# Patient Record
Sex: MALE | Race: BLACK OR AFRICAN AMERICAN | NOT HISPANIC OR LATINO | Employment: UNEMPLOYED | URBAN - METROPOLITAN AREA
[De-identification: names, ages, dates, MRNs, and addresses within clinical notes are randomized per-mention and may not be internally consistent; named-entity substitution may affect disease eponyms.]

---

## 2018-06-02 ENCOUNTER — HOSPITAL ENCOUNTER (EMERGENCY)
Facility: HOSPITAL | Age: 48
Discharge: PSYCHIATRIC OR SUBSTANCE USE FACILITY | End: 2018-06-02
Attending: EMERGENCY MEDICINE
Payer: MEDICAID

## 2018-06-02 VITALS
WEIGHT: 160 LBS | RESPIRATION RATE: 18 BRPM | HEIGHT: 73 IN | SYSTOLIC BLOOD PRESSURE: 130 MMHG | TEMPERATURE: 99 F | OXYGEN SATURATION: 99 % | DIASTOLIC BLOOD PRESSURE: 93 MMHG | HEART RATE: 70 BPM | BODY MASS INDEX: 21.2 KG/M2

## 2018-06-02 DIAGNOSIS — F32.A DEPRESSION, UNSPECIFIED DEPRESSION TYPE: Primary | ICD-10-CM

## 2018-06-02 PROBLEM — R45.851 SUICIDAL IDEATION: Status: ACTIVE | Noted: 2018-06-02

## 2018-06-02 PROBLEM — M54.40 CHRONIC RIGHT-SIDED LOW BACK PAIN WITH SCIATICA: Status: ACTIVE | Noted: 2018-06-02

## 2018-06-02 PROBLEM — F12.10 TETRAHYDROCANNABINOL (THC) USE DISORDER, MILD, ABUSE: Status: ACTIVE | Noted: 2018-06-02

## 2018-06-02 PROBLEM — G89.29 CHRONIC RIGHT-SIDED LOW BACK PAIN WITH SCIATICA: Status: ACTIVE | Noted: 2018-06-02

## 2018-06-02 PROBLEM — F32.9 MAJOR DEPRESSION, SINGLE EPISODE: Status: ACTIVE | Noted: 2018-06-02

## 2018-06-02 PROBLEM — D72.829 LEUKOCYTOSIS: Status: ACTIVE | Noted: 2018-06-02

## 2018-06-02 LAB
ALBUMIN SERPL BCP-MCNC: 3.6 G/DL
ALP SERPL-CCNC: 81 U/L
ALT SERPL W/O P-5'-P-CCNC: 8 U/L
AMPHET+METHAMPHET UR QL: NEGATIVE
ANION GAP SERPL CALC-SCNC: 7 MMOL/L
APAP SERPL-MCNC: <3 UG/ML
AST SERPL-CCNC: 19 U/L
BARBITURATES UR QL SCN>200 NG/ML: NEGATIVE
BASOPHILS # BLD AUTO: 0.07 K/UL
BASOPHILS NFR BLD: 0.5 %
BENZODIAZ UR QL SCN>200 NG/ML: NEGATIVE
BILIRUB SERPL-MCNC: 0.3 MG/DL
BILIRUB UR QL STRIP: NEGATIVE
BUN SERPL-MCNC: 22 MG/DL
BZE UR QL SCN: NORMAL
CALCIUM SERPL-MCNC: 9.2 MG/DL
CANNABINOIDS UR QL SCN: NORMAL
CHLORIDE SERPL-SCNC: 111 MMOL/L
CLARITY UR REFRACT.AUTO: CLEAR
CO2 SERPL-SCNC: 24 MMOL/L
COLOR UR AUTO: YELLOW
CREAT SERPL-MCNC: 1.1 MG/DL
CREAT UR-MCNC: 88 MG/DL
DIFFERENTIAL METHOD: ABNORMAL
EOSINOPHIL # BLD AUTO: 0.2 K/UL
EOSINOPHIL NFR BLD: 1.3 %
ERYTHROCYTE [DISTWIDTH] IN BLOOD BY AUTOMATED COUNT: 13.8 %
EST. GFR  (AFRICAN AMERICAN): >60 ML/MIN/1.73 M^2
EST. GFR  (NON AFRICAN AMERICAN): >60 ML/MIN/1.73 M^2
ETHANOL SERPL-MCNC: <10 MG/DL
GLUCOSE SERPL-MCNC: 104 MG/DL
GLUCOSE UR QL STRIP: NEGATIVE
HCT VFR BLD AUTO: 44.8 %
HGB BLD-MCNC: 14.6 G/DL
HGB UR QL STRIP: NEGATIVE
IMM GRANULOCYTES # BLD AUTO: 0.07 K/UL
IMM GRANULOCYTES NFR BLD AUTO: 0.5 %
KETONES UR QL STRIP: NEGATIVE
LEUKOCYTE ESTERASE UR QL STRIP: NEGATIVE
LYMPHOCYTES # BLD AUTO: 1.9 K/UL
LYMPHOCYTES NFR BLD: 13.6 %
MCH RBC QN AUTO: 29.9 PG
MCHC RBC AUTO-ENTMCNC: 32.6 G/DL
MCV RBC AUTO: 92 FL
METHADONE UR QL SCN>300 NG/ML: NEGATIVE
MONOCYTES # BLD AUTO: 0.8 K/UL
MONOCYTES NFR BLD: 5.7 %
NEUTROPHILS # BLD AUTO: 10.9 K/UL
NEUTROPHILS NFR BLD: 78.4 %
NITRITE UR QL STRIP: NEGATIVE
NRBC BLD-RTO: 0 /100 WBC
OPIATES UR QL SCN: NEGATIVE
PCP UR QL SCN>25 NG/ML: NEGATIVE
PH UR STRIP: 5 [PH] (ref 5–8)
PLATELET # BLD AUTO: 271 K/UL
PMV BLD AUTO: 10.5 FL
POTASSIUM SERPL-SCNC: 4.4 MMOL/L
PROT SERPL-MCNC: 6.7 G/DL
PROT UR QL STRIP: NEGATIVE
RBC # BLD AUTO: 4.89 M/UL
SODIUM SERPL-SCNC: 142 MMOL/L
SP GR UR STRIP: 1.02 (ref 1–1.03)
TOXICOLOGY INFORMATION: NORMAL
TSH SERPL DL<=0.005 MIU/L-ACNC: 0.57 UIU/ML
URN SPEC COLLECT METH UR: NORMAL
UROBILINOGEN UR STRIP-ACNC: NEGATIVE EU/DL
WBC # BLD AUTO: 13.92 K/UL

## 2018-06-02 PROCEDURE — 81003 URINALYSIS AUTO W/O SCOPE: CPT | Mod: 59

## 2018-06-02 PROCEDURE — 80320 DRUG SCREEN QUANTALCOHOLS: CPT

## 2018-06-02 PROCEDURE — 80053 COMPREHEN METABOLIC PANEL: CPT

## 2018-06-02 PROCEDURE — 80329 ANALGESICS NON-OPIOID 1 OR 2: CPT

## 2018-06-02 PROCEDURE — 63600175 PHARM REV CODE 636 W HCPCS: Performed by: PHYSICIAN ASSISTANT

## 2018-06-02 PROCEDURE — 85025 COMPLETE CBC W/AUTO DIFF WBC: CPT

## 2018-06-02 PROCEDURE — 80307 DRUG TEST PRSMV CHEM ANLYZR: CPT

## 2018-06-02 PROCEDURE — 96372 THER/PROPH/DIAG INJ SC/IM: CPT

## 2018-06-02 PROCEDURE — 84443 ASSAY THYROID STIM HORMONE: CPT

## 2018-06-02 PROCEDURE — 25000003 PHARM REV CODE 250: Performed by: PHYSICIAN ASSISTANT

## 2018-06-02 PROCEDURE — 99285 EMERGENCY DEPT VISIT HI MDM: CPT | Mod: 25

## 2018-06-02 PROCEDURE — 99283 EMERGENCY DEPT VISIT LOW MDM: CPT | Mod: ,,, | Performed by: EMERGENCY MEDICINE

## 2018-06-02 RX ORDER — KETOROLAC TROMETHAMINE 30 MG/ML
10 INJECTION, SOLUTION INTRAMUSCULAR; INTRAVENOUS
Status: COMPLETED | OUTPATIENT
Start: 2018-06-02 | End: 2018-06-02

## 2018-06-02 RX ORDER — NAPROXEN 500 MG/1
500 TABLET ORAL 2 TIMES DAILY WITH MEALS
Status: DISCONTINUED | OUTPATIENT
Start: 2018-06-03 | End: 2018-06-02

## 2018-06-02 RX ORDER — DIPHENHYDRAMINE HYDROCHLORIDE 50 MG/ML
50 INJECTION INTRAMUSCULAR; INTRAVENOUS EVERY 4 HOURS PRN
Status: DISCONTINUED | OUTPATIENT
Start: 2018-06-02 | End: 2018-06-02 | Stop reason: HOSPADM

## 2018-06-02 RX ORDER — HALOPERIDOL 5 MG/ML
5 INJECTION INTRAMUSCULAR EVERY 4 HOURS PRN
Status: DISCONTINUED | OUTPATIENT
Start: 2018-06-02 | End: 2018-06-02 | Stop reason: HOSPADM

## 2018-06-02 RX ORDER — ACETAMINOPHEN 325 MG/1
650 TABLET ORAL EVERY 6 HOURS PRN
Status: DISCONTINUED | OUTPATIENT
Start: 2018-06-02 | End: 2018-06-02 | Stop reason: HOSPADM

## 2018-06-02 RX ORDER — GABAPENTIN 100 MG/1
300 CAPSULE ORAL 3 TIMES DAILY
Status: DISCONTINUED | OUTPATIENT
Start: 2018-06-02 | End: 2018-06-02 | Stop reason: HOSPADM

## 2018-06-02 RX ORDER — NAPROXEN 250 MG/1
500 TABLET ORAL
Status: DISCONTINUED | OUTPATIENT
Start: 2018-06-02 | End: 2018-06-02 | Stop reason: HOSPADM

## 2018-06-02 RX ADMIN — GABAPENTIN 300 MG: 300 CAPSULE ORAL at 03:06

## 2018-06-02 RX ADMIN — KETOROLAC TROMETHAMINE 10 MG: 30 INJECTION, SOLUTION INTRAMUSCULAR at 03:06

## 2018-06-02 NOTE — ED NOTES
[facility] [employer]  has arrived. Two security guards and RN are present to escort pt to vehicle.  is called [facility] was given Patient Documentation, PEC, and patient belongings. Patient stated that he does not want to call anyone to let them know about transfer to a different facility. Patient is calm and cooperative.

## 2018-06-02 NOTE — CONSULTS
"[facility]  Psychiatry  Consult Note    Patient Name: Jayde Rao  MRN: [id number]   Code Status: No Order  Admission Date: 6/2/2018  Hospital Length of Stay: 0 days  Attending Physician: Carmela Guajardo MD  Primary Care Provider: Primary Doctor No    Current Legal Status: PEC    Patient information was obtained from patient and ER records.   Inpatient consult to Psychiatry  Consult performed by: JOÃO LAWS  Consult ordered by: LEOLA HUTSON        Subjective:     Principal Problem: Suicdal ideation      HPI:   Patient was seen/evaluated by me. Chart reviewed. The student interviewed the pt first and then I performed an evaluation. Our findings are integrated below. We discussed the client's evaluation and devised an assessment/plan.  The student documented parts of the note with supervision and editing.     Per primary service notes:   Patient is a 48 y.o. male with past psychiatric history of "bipolar disorder," who presented voluntarily, reporting depression, feeling tat he has hit "rock bottom" and asking for admission to inpatient psychiatric care. In the ED, Tox + cocaine, THC. Psychiatry was consulted for "depression."      Per chart review:  No prior formal psychiatric evaluations in chart    On interview, patient lying in bed, sits up to talk with interviewer. States that he has been feeling depressed chronically, but mood had started to improve after his most recent psychiatric hospitalization in [place], which he reports ended two Thursdays prior to presentation, stated he was admitted for depression and stabilized on zoloft and remeron, he believes 50mg daily and 45mg nightly respectively, but reports that he decompensated afterwards and attributes this to not being able to fill his medications, though after discussion states that he has been told in the past that alcohol, cocaine and marijuana use can dysregulate mood as well (reports approximately 6pack/week " "etoh and monthly use of cocaine and marijuana, most recent use 3 days prior to presentation).  He reports that he feels that he has hit "rock bottom" and is feeling "hopeless", reporting severe depressed mood, anhedonia, poor concentration, insomnia reporting no more than a few hours of sleep in the last 4 days with associated fatigue, impaired appetite, anxious mood with intrusive negative thoughts that at times goad him to make poor choices, including intrusive thoughts of suicide, though without current intent or plan.     He reported that this inability to fill his rx's was due to his insurance being lapsed. He states he has now taken care of the insurance, but feels too unstable to go home to restart medications at this time and feels he needs initial inpatient stabilization first. States goals are stabilization, then establishing care (has started the process of getting established at [facility] since last discharge).         Medications:  Home Meds: zoloft unknown dose possibly 50mg nightly, and remeron unknown dose but possibly 45mg nightly.        Allergies:  Review of patient's allergies indicates:  No Known Allergies       Past Medical History:  PCP: none  Yes head trauma years ago, got evaluated and got the all clear  No seizures  Past Medical History:   Diagnosis Date    Bipolar depression     Low back pain       denies past surgeries     Past Psychiatric History:  Previous Medication Trials: yes, but did not fill the zoloft/remeron due to  Medicaid    Previous Psychiatric Hospitalizations: yes, 1 week ago for MDD and "bipolar" (had been using cocaine) and in  for MDD and anxiety   Previous Suicide Attempts: no   History of Violence: unknown  Outpatient Psychiatrist: none, was trying to get established at [facility]     Social History:  Marital Status: single  Children: 2   Employment Status/Info: unemployed   Education: high school diploma/GED  Special Ed: no  Housing Status: lives alone " in house   Hobbies/Leisure time: writing   Access to gun: no     Family Psychiatric History: Brother - schizophrenia      Substance Abuse History:  Recreational Drugs: cocaine and marijuana  Use of Alcohol: remote hx binges but never daily drinking, for the past many months no more than 2 beers every 2-3 days  Rehab History: yes, [facility] when federally mandated for 28 days for marijuana  Tobacco Use:yes, 1 pack a day, declined patch     Legal History:  Past Charges/Incarcerations:yes, 15 years plus additional 8 year, patient stated was due to firearm possession   Pending charges:unknown               Hospital Course: No notes on file         Patient History           Medical as of 6/2/2018     Past Medical History     Diagnosis Date Comments Source    Bipolar depression 2012 -- Provider    Low back pain -- -- Provider                  Surgical as of 6/2/2018     Past Surgical History     Procedure Laterality Date Comments Source    LUMBAR DISC SURGERY -- 2011 -- Provider                  Family as of 6/2/2018    **None**           Tobacco Use as of 6/2/2018     Smoking Status Smoking Start Date Smoking Quit Date Packs/day Years Used    Current Every Day Smoker -- -- 0.50 --    Types Comments Smokeless Tobacco Status Smokeless Tobacco Quit Date Source     Cigarettes -- Unknown -- Provider            Alcohol Use as of 6/2/2018     Alcohol Use Drinks/Week Alcohol/Week Comments Source    Yes -- -- -- Provider            Drug Use as of 6/2/2018     Drug Use Types Frequency Comments Source    Yes  Marijuana -- -- Provider            Sexual Activity as of 6/2/2018     Sexually Active Birth Control Partners Comments Source    Yes -- -- -- Provider            Activities of Daily Living as of 6/2/2018    **None**           Social Documentation as of 6/2/2018    **None**           Occupational as of 6/2/2018    **None**           Socioeconomic as of 6/2/2018     Marital Status Spouse Name Number of Children Years Education  "Preferred Language Ethnicity Race Source     -- -- -- English /Black Black or  --         Pertinent History Q A Comments    as of 6/2/2018 Lives with      Place in Birth Order      Lives in      Number of Siblings      Raised by      Legal Involvement      Childhood Trauma      Criminal History of      Financial Status      Highest Level of Education      Does patient have access to a firearm?       Service      Primary Leisure Activity      Spirituality       Past Medical History:   Diagnosis Date    Bipolar depression 2012    Low back pain      Past Surgical History:   Procedure Laterality Date    LUMBAR DISC SURGERY  2011     Family History     None        Social History Main Topics    Smoking status: Current Every Day Smoker     Packs/day: 0.50     Types: Cigarettes    Smokeless tobacco: Not on file    Alcohol use Yes    Drug use: Yes     Types: Marijuana    Sexual activity: Yes     Review of patient's allergies indicates:  No Known Allergies    No current facility-administered medications on file prior to encounter.      No current outpatient prescriptions on file prior to encounter.     Psychotherapeutics     None        Review of Systems   See HPI section    Objective:     Vital Signs (Most Recent):  Temp: 98.1 °F (36.7 °C) (06/02/18 1028)  Pulse: 84 (06/02/18 1028)  Resp: 16 (06/02/18 1028)  BP: (!) 161/85 (06/02/18 1028)  SpO2: 95 % (06/02/18 1028) Vital Signs (24h Range):  Temp:  [98.1 °F (36.7 °C)] 98.1 °F (36.7 °C)  Pulse:  [84] 84  Resp:  [16] 16  SpO2:  [95 %] 95 %  BP: (161)/(85) 161/85     Height: 6' 1" (185.4 cm)  Weight: 72.6 kg (160 lb)  Body mass index is 21.11 kg/m².    No intake or output data in the 24 hours ending 06/02/18 1407    Physical Exam   Psychiatric:   Mental Status Exam  General appearance and behavior: No acute distress, age appropriate, nontoxic, well cared for, tattoos  Level of Consciousness: awake, alert  Attention: " "Attends to interview without distraction   Orientation: intact to self, ED, [facility], situation as per HPI   Psychomotor Behavior: no retardation or agitation  Speech:  normal rate, tone, and articulation of speech  Language: prosody intact, spontaneous, and appropriate without evidence of neologisms or gross idiosyncrasies   Mood: "depressed"   Affect: Anxious  Thought Process: linear, logical, goal directed  Thought Content: intrusive suicidal ideation that has not escalated to intent or plan, no noted homicidal ideation  Perceptual Disturbances: "voices" are described as coming from within his head, more consistent with intrusive internal thoughts than hallucinations   Memory: intact to recent medical events  Fund of Knowledge: Intact and Vocabulary consistent with education  Insight:  Pt has awareness of illness  Judgment: Behavior adequate for circumstances, came to ED to seek help              Significant Labs:   Last 24 Hours:   Recent Lab Results       06/02/18  1057      Benzodiazepines Negative     Methadone metabolites Negative     Phencyclidine Negative     Immature Granulocytes 0.5     Immature Grans (Abs) 0.07  Comment:  Mild elevation in immature granulocytes is non specific and   can be seen in a variety of conditions including stress response,   acute inflammation, trauma and pregnancy. Correlation with other   laboratory and clinical findings is essential.  (H)     Acetaminophen (Tylenol), Serum <3.0  Comment:  Toxic Levels:  Adults (4 hr post-ingestion).........>150 ug/mL  Adults (12 hr post-ingestion)........>40 ug/mL  Peds (2 hr post-ingestion, liquid)...>225 ug/mL  (L)     Albumin 3.6     Alcohol, Medical, Serum <10     Alkaline Phosphatase 81     ALT 8(L)     Amphetamine Screen, Ur Negative     Anion Gap 7(L)     Appearance, UA Clear     AST 19     Barbiturate Screen, Ur Negative     Baso # 0.07     Basophil% 0.5     Bilirubin (UA) Negative     Total Bilirubin 0.3  Comment:  For infants and " newborns, interpretation of results should be based  on gestational age, weight and in agreement with clinical  observations.  Premature Infant recommended reference ranges:  Up to 24 hours.............<8.0 mg/dL  Up to 48 hours............<12.0 mg/dL  3-5 days..................<15.0 mg/dL  6-29 days.................<15.0 mg/dL       BUN, Bld 22(H)     Calcium 9.2     Chloride 111(H)     CO2 24     Cocaine (Metab.) Presumptive Positive     Color, UA Yellow     Creatinine 1.1     Creatinine, Random Ur 88.0  Comment:  The random urine reference ranges provided were established   for 24 hour urine collections.  No reference ranges exist for  random urine specimens.  Correlate clinically.       Differential Method Automated     eGFR if African American >60.0     eGFR if non  >60.0  Comment:  Calculation used to obtain the estimated glomerular filtration  rate (eGFR) is the CKD-EPI equation.        Eos # 0.2     Eosinophil% 1.3     Glucose 104     Glucose, UA Negative     Gran # (ANC) 10.9(H)     Gran% 78.4(H)     Hematocrit 44.8     Hemoglobin 14.6     Ketones, UA Negative     Leukocytes, UA Negative     Lymph # 1.9     Lymph% 13.6(L)     MCH 29.9     MCHC 32.6     MCV 92     Mono # 0.8     Mono% 5.7     MPV 10.5     Nitrite, UA Negative     nRBC 0     Occult Blood UA Negative     Opiate Scrn, Ur Negative     pH, UA 5.0     Platelets 271     Potassium 4.4     Total Protein 6.7     Protein, UA Negative  Comment:  Recommend a 24 hour urine protein or a urine   protein/creatinine ratio if globulin induced proteinuria is  clinically suspected.       RBC 4.89     RDW 13.8     Sodium 142     Specific Gravity, UA 1.020     Specimen UA Urine, Clean Catch     Marijuana (THC) Metabolite Presumptive Positive     Toxicology Information SEE COMMENT  Comment:  This screen includes the following classes of drugs at the   listed cut-off:  Benzodiazepines                  200 ng/ml  Methadone                        300  ng/ml  Cocaine metabolite               300 ng/ml  Opiates                          300 ng/ml  Barbiturates                     200 ng/ml  Amphetamines                    1000 ng/ml  Marijuana metabs (THC)            50 ng/ml  Phencyclidine (PCP)               25 ng/ml  High concentrations of Diphenhydramine may cross-react with  Phencyclidine PCP screening immunoassay giving a false   positive result.  High concentrations of Methylenedioxymethamphetamine (MDMA aka  Ectasy) and other structurally similar compounds may cross-   react with the Amphetamine/Methamphetamine screening   immunoassay giving a false positive result.  A metabolite of the anti-HIV drug Sustiva () may cause  false positive results in the Marijuana metabolite (THC)   screening assay.  Note: This exception list includes only more common   interferants in toxicology screen testing.  Because of many   cross-reactantspositive results on toxicology drug screens   should be confirmed whenever results do not correlate with   clinical presentation.  This report is intended for use in clinical monitoring and  management of patients. It is not intended for use in   employment related drug testing.  Because of any cross-reactants, positive results on toxicology  drug screens should be confirmed whenever results do not  correlate with clinical presentation.  Presumptive positive results are unconfirmed and may be used   only for medical purposes.       TSH 0.570     Urobilinogen, UA Negative     WBC 13.92(H)           Significant Imaging: I have reviewed all pertinent imaging results/findings within the past 24 hours.    Assessment/Plan:     Suicidal ideation    PT presents reporting SI and not feeling safe to go home in the context of low mood, poor sleep, unable to fill psychiatric medications since last discharge from inpatient psychiatric hospitalization, recent cocaine and marijuana use. DDX includes MDD, substance induced mood disorder, bipolar  "current episode depressed (though unclear that he has had sx c/w rafal or hypomania in the absence of substance use). Reports last discharge medications were zoloft and remeron. ("voices" reported less consistent with auditory hallucinations and more consistent with intrusive thoughts)       1. Dispo/Legal Status: Cont PEC at this time as the pt is currently dangerous to self. Seek inpt bed for pt safety and stabilization when/if medically cleared by the ER MD. Continue to observe pt's behavior while in the ER and will reassess the pt daily until placement is found.  2. Scheduled Medications: remeron 30mg nightly. Defer any non-psych meds to the ER MD.  3. PRN Medications: zyprexa 10mg q8h prn non-redirectable agitation associated with breakthrough psychosis or rafal if needed to help the pt more effectively interact with his environment.   4. Precautions/Nursing: suicide  5. Case Discussed with: Dr. Messer, ED staff                 Total Time:  60 minutes      Mariama Hill MD   Psychiatry  [facility]  "
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
## 2018-06-02 NOTE — ED NOTES
Pt has been accepted to [facility] via [facility]. Accepting MD is Dr. Cifuentes. Call report to [phone].      Address:   [address] [place] [address] Ernestina Bush

## 2018-06-02 NOTE — ED TRIAGE NOTES
Pt arrives  - states not had his pysch meds in a week and is afraid of what he will do. Denies SI/HI. AAOx4, skin w/d, respirations even and unlabored Cooperative. Xavier Fraser at bedside. Al personal belongings bagged and labeled and placed in secured pt locker on unit. Xavier has no personal belongings  In room

## 2018-06-02 NOTE — SUBJECTIVE & OBJECTIVE
Patient History           Medical as of 6/2/2018     Past Medical History     Diagnosis Date Comments Source    Bipolar depression 2012 -- Provider    Low back pain -- -- Provider                  Surgical as of 6/2/2018     Past Surgical History     Procedure Laterality Date Comments Source    LUMBAR DISC SURGERY -- 2011 -- Provider                  Family as of 6/2/2018    **None**           Tobacco Use as of 6/2/2018     Smoking Status Smoking Start Date Smoking Quit Date Packs/day Years Used    Current Every Day Smoker -- -- 0.50 --    Types Comments Smokeless Tobacco Status Smokeless Tobacco Quit Date Source     Cigarettes -- Unknown -- Provider            Alcohol Use as of 6/2/2018     Alcohol Use Drinks/Week Alcohol/Week Comments Source    Yes -- -- -- Provider            Drug Use as of 6/2/2018     Drug Use Types Frequency Comments Source    Yes  Marijuana -- -- Provider            Sexual Activity as of 6/2/2018     Sexually Active Birth Control Partners Comments Source    Yes -- -- -- Provider            Activities of Daily Living as of 6/2/2018    **None**           Social Documentation as of 6/2/2018    **None**           Occupational as of 6/2/2018    **None**           Socioeconomic as of 6/2/2018     Marital Status Spouse Name Number of Children Years Education Preferred Language Ethnicity Race Source     -- -- -- English /Black Black or  --         Pertinent History Q A Comments    as of 6/2/2018 Lives with      Place in Birth Order      Lives in      Number of Siblings      Raised by      Legal Involvement      Childhood Trauma      Criminal History of      Financial Status      Highest Level of Education      Does patient have access to a firearm?       Service      Primary Leisure Activity      Spirituality       Past Medical History:   Diagnosis Date    Bipolar depression 2012    Low back pain      Past Surgical History:   Procedure Laterality  "Date    LUMBAR DISC SURGERY  2011     Family History     None        Social History Main Topics    Smoking status: Current Every Day Smoker     Packs/day: 0.50     Types: Cigarettes    Smokeless tobacco: Not on file    Alcohol use Yes    Drug use: Yes     Types: Marijuana    Sexual activity: Yes     Review of patient's allergies indicates:  No Known Allergies    No current facility-administered medications on file prior to encounter.      No current outpatient prescriptions on file prior to encounter.     Psychotherapeutics     None        Review of Systems   See HPI section    Objective:     Vital Signs (Most Recent):  Temp: 98.1 °F (36.7 °C) (06/02/18 1028)  Pulse: 84 (06/02/18 1028)  Resp: 16 (06/02/18 1028)  BP: (!) 161/85 (06/02/18 1028)  SpO2: 95 % (06/02/18 1028) Vital Signs (24h Range):  Temp:  [98.1 °F (36.7 °C)] 98.1 °F (36.7 °C)  Pulse:  [84] 84  Resp:  [16] 16  SpO2:  [95 %] 95 %  BP: (161)/(85) 161/85     Height: 6' 1" (185.4 cm)  Weight: 72.6 kg (160 lb)  Body mass index is 21.11 kg/m².    No intake or output data in the 24 hours ending 06/02/18 1407    Physical Exam   Psychiatric:   Mental Status Exam  General appearance and behavior: No acute distress, age appropriate, nontoxic, well cared for, tattoos  Level of Consciousness: awake, alert  Attention: Attends to interview without distraction   Orientation: intact to self, ED, [facility], situation as per HPI   Psychomotor Behavior: no retardation or agitation  Speech:  normal rate, tone, and articulation of speech  Language: prosody intact, spontaneous, and appropriate without evidence of neologisms or gross idiosyncrasies   Mood: "depressed"   Affect: Anxious  Thought Process: linear, logical, goal directed  Thought Content: intrusive suicidal ideation that has not escalated to intent or plan, no noted homicidal ideation  Perceptual Disturbances: "voices" are described as coming from within his head, more consistent with intrusive internal " thoughts than hallucinations   Memory: intact to recent medical events  Fund of Knowledge: Intact and Vocabulary consistent with education  Insight:  Pt has awareness of illness  Judgment: Behavior adequate for circumstances, came to ED to seek help              Significant Labs:   Last 24 Hours:   Recent Lab Results       06/02/18  1057      Benzodiazepines Negative     Methadone metabolites Negative     Phencyclidine Negative     Immature Granulocytes 0.5     Immature Grans (Abs) 0.07  Comment:  Mild elevation in immature granulocytes is non specific and   can be seen in a variety of conditions including stress response,   acute inflammation, trauma and pregnancy. Correlation with other   laboratory and clinical findings is essential.  (H)     Acetaminophen (Tylenol), Serum <3.0  Comment:  Toxic Levels:  Adults (4 hr post-ingestion).........>150 ug/mL  Adults (12 hr post-ingestion)........>40 ug/mL  Peds (2 hr post-ingestion, liquid)...>225 ug/mL  (L)     Albumin 3.6     Alcohol, Medical, Serum <10     Alkaline Phosphatase 81     ALT 8(L)     Amphetamine Screen, Ur Negative     Anion Gap 7(L)     Appearance, UA Clear     AST 19     Barbiturate Screen, Ur Negative     Baso # 0.07     Basophil% 0.5     Bilirubin (UA) Negative     Total Bilirubin 0.3  Comment:  For infants and newborns, interpretation of results should be based  on gestational age, weight and in agreement with clinical  observations.  Premature Infant recommended reference ranges:  Up to 24 hours.............<8.0 mg/dL  Up to 48 hours............<12.0 mg/dL  3-5 days..................<15.0 mg/dL  6-29 days.................<15.0 mg/dL       BUN, Bld 22(H)     Calcium 9.2     Chloride 111(H)     CO2 24     Cocaine (Metab.) Presumptive Positive     Color, UA Yellow     Creatinine 1.1     Creatinine, Random Ur 88.0  Comment:  The random urine reference ranges provided were established   for 24 hour urine collections.  No reference ranges exist  for  random urine specimens.  Correlate clinically.       Differential Method Automated     eGFR if African American >60.0     eGFR if non  >60.0  Comment:  Calculation used to obtain the estimated glomerular filtration  rate (eGFR) is the CKD-EPI equation.        Eos # 0.2     Eosinophil% 1.3     Glucose 104     Glucose, UA Negative     Gran # (ANC) 10.9(H)     Gran% 78.4(H)     Hematocrit 44.8     Hemoglobin 14.6     Ketones, UA Negative     Leukocytes, UA Negative     Lymph # 1.9     Lymph% 13.6(L)     MCH 29.9     MCHC 32.6     MCV 92     Mono # 0.8     Mono% 5.7     MPV 10.5     Nitrite, UA Negative     nRBC 0     Occult Blood UA Negative     Opiate Scrn, Ur Negative     pH, UA 5.0     Platelets 271     Potassium 4.4     Total Protein 6.7     Protein, UA Negative  Comment:  Recommend a 24 hour urine protein or a urine   protein/creatinine ratio if globulin induced proteinuria is  clinically suspected.       RBC 4.89     RDW 13.8     Sodium 142     Specific Gravity, UA 1.020     Specimen UA Urine, Clean Catch     Marijuana (THC) Metabolite Presumptive Positive     Toxicology Information SEE COMMENT  Comment:  This screen includes the following classes of drugs at the   listed cut-off:  Benzodiazepines                  200 ng/ml  Methadone                        300 ng/ml  Cocaine metabolite               300 ng/ml  Opiates                          300 ng/ml  Barbiturates                     200 ng/ml  Amphetamines                    1000 ng/ml  Marijuana metabs (THC)            50 ng/ml  Phencyclidine (PCP)               25 ng/ml  High concentrations of Diphenhydramine may cross-react with  Phencyclidine PCP screening immunoassay giving a false   positive result.  High concentrations of Methylenedioxymethamphetamine (MDMA aka  Ectasy) and other structurally similar compounds may cross-   react with the Amphetamine/Methamphetamine screening   immunoassay giving a false positive result.  A  metabolite of the anti-HIV drug Sustiva () may cause  false positive results in the Marijuana metabolite (THC)   screening assay.  Note: This exception list includes only more common   interferants in toxicology screen testing.  Because of many   cross-reactantspositive results on toxicology drug screens   should be confirmed whenever results do not correlate with   clinical presentation.  This report is intended for use in clinical monitoring and  management of patients. It is not intended for use in   employment related drug testing.  Because of any cross-reactants, positive results on toxicology  drug screens should be confirmed whenever results do not  correlate with clinical presentation.  Presumptive positive results are unconfirmed and may be used   only for medical purposes.       TSH 0.570     Urobilinogen, UA Negative     WBC 13.92(H)           Significant Imaging: I have reviewed all pertinent imaging results/findings within the past 24 hours.

## 2018-06-02 NOTE — HPI
"Patient was seen/evaluated by me. Chart reviewed. The student interviewed the pt first and then I performed an evaluation. Our findings are integrated below. We discussed the client's evaluation and devised an assessment/plan.  The student documented parts of the note with supervision and editing.     Per primary service notes:   Patient is a 48 y.o. male with past psychiatric history of "bipolar disorder," who presented voluntarily, reporting depression, feeling tat he has hit "rock bottom" and asking for admission to inpatient psychiatric care. In the ED, Tox + cocaine, THC. Psychiatry was consulted for "depression."      Per chart review:  No prior formal psychiatric evaluations in chart    On interview, patient lying in bed, sits up to talk with interviewer. States that he has been feeling depressed chronically, but mood had started to improve after his most recent psychiatric hospitalization in [place], which he reports ended two Thursdays prior to presentation, stated he was admitted for depression and stabilized on zoloft and remeron, he believes 50mg daily and 45mg nightly respectively, but reports that he decompensated afterwards and attributes this to not being able to fill his medications, though after discussion states that he has been told in the past that alcohol, cocaine and marijuana use can dysregulate mood as well (reports approximately 6pack/week etoh and monthly use of cocaine and marijuana, most recent use 3 days prior to presentation).  He reports that he feels that he has hit "rock bottom" and is feeling "hopeless", reporting severe depressed mood, anhedonia, poor concentration, insomnia reporting no more than a few hours of sleep in the last 4 days with associated fatigue, impaired appetite, anxious mood with intrusive negative thoughts that at times goad him to make poor choices, including intrusive thoughts of suicide, though without current intent or plan.     He reported that this " "inability to fill his rx's was due to his insurance being lapsed. He states he has now taken care of the insurance, but feels too unstable to go home to restart medications at this time and feels he needs initial inpatient stabilization first. States goals are stabilization, then establishing care (has started the process of getting established at [facility] since last discharge).         Medications:  Home Meds: zoloft unknown dose possibly 50mg nightly, and remeron unknown dose but possibly 45mg nightly.        Allergies:  Review of patient's allergies indicates:  No Known Allergies       Past Medical History:  PCP: none  Yes head trauma years ago, got evaluated and got the all clear  No seizures  Past Medical History:   Diagnosis Date    Bipolar depression     Low back pain       denies past surgeries     Past Psychiatric History:  Previous Medication Trials: yes, but did not fill the zoloft/remeron due to  Medicaid    Previous Psychiatric Hospitalizations: yes, 1 week ago for MDD and "bipolar" (had been using cocaine) and in  for MDD and anxiety   Previous Suicide Attempts: no   History of Violence: unknown  Outpatient Psychiatrist: none, was trying to get established at [facility]     Social History:  Marital Status: single  Children: 2   Employment Status/Info: unemployed   Education: high school diploma/GED  Special Ed: no  Housing Status: lives alone in house   Hobbies/Leisure time: writing   Access to gun: no     Family Psychiatric History: Brother - schizophrenia      Substance Abuse History:  Recreational Drugs: cocaine and marijuana  Use of Alcohol: remote hx binges but never daily drinking, for the past many months no more than 2 beers every 2-3 days  Rehab History: yes, [facility] when federally mandated for 28 days for marijuana  Tobacco Use:yes, 1 pack a day, declined patch     Legal History:  Past Charges/Incarcerations:yes, 15 years plus additional 8 year, patient stated was due to firearm " possession   Pending charges:unknown

## 2018-06-02 NOTE — ASSESSMENT & PLAN NOTE
"PT presents reporting SI and not feeling safe to go home in the context of low mood, poor sleep, unable to fill psychiatric medications since last discharge from inpatient psychiatric hospitalization, recent cocaine and marijuana use. DDX includes MDD, substance induced mood disorder, bipolar current episode depressed (though unclear that he has had sx c/w rafal or hypomania in the absence of substance use). Reports last discharge medications were zoloft and remeron. ("voices" reported less consistent with auditory hallucinations and more consistent with intrusive thoughts)       1. Dispo/Legal Status: Cont PEC at this time as the pt is currently dangerous to self. Seek inpt bed for pt safety and stabilization when/if medically cleared by the ER MD. Continue to observe pt's behavior while in the ER and will reassess the pt daily until placement is found.  2. Scheduled Medications: remeron 30mg nightly. Defer any non-psych meds to the ER MD.  3. PRN Medications: zyprexa 10mg q8h prn non-redirectable agitation associated with breakthrough psychosis or rafal if needed to help the pt more effectively interact with his environment.   4. Precautions/Nursing: suicide  5. Case Discussed with: Dr. Messer, ED staff      "

## 2018-06-02 NOTE — ED PROVIDER NOTES
"Encounter Date: 6/2/2018    SCRIBE #1 NOTE: I, Trisha Verdugo, am scribing for, and in the presence of,  Dr. Manjarrez. I have scribed the entire note. the APC attestation.       History     Chief Complaint   Patient presents with    Psychiatric Evaluation     Pt states that he hasnt been on his meds and is not sure of how he is feeling.  Pt states "I dont wanna kill nobody but I dont trust myself"     48-year-old male with past medical history of bipolar presents to the ED for psychiatric evaluation.  He was discharged about a week ago from an inpatient psychiatric facility in [place].  States that while he was there he was diagnosed with major depressive disorder and started on Zoloft and Remeron.  He was discharged with prescriptions for the same but has been unable to afford prescription and has not been on any medication for the past week.  He reports some auditory hallucinations.  Denies any suicidal or homicidal ideation but just states that he is "very depressed and has had rock bottom".  He denies fever, chills, chest pain, shortness breath, abdominal pain, nausea, headache.      The history is provided by the patient.     Review of patient's allergies indicates:  No Known Allergies  Past Medical History:   Diagnosis Date    Bipolar depression 2012    Low back pain      Past Surgical History:   Procedure Laterality Date    LUMBAR DISC SURGERY  2011     History reviewed. No pertinent family history.  Social History   Substance Use Topics    Smoking status: Current Every Day Smoker     Packs/day: 0.50     Types: Cigarettes    Smokeless tobacco: Not on file    Alcohol use Yes     Review of Systems   Constitutional: Negative for chills and fever.   HENT: Negative for congestion, rhinorrhea and sore throat.    Eyes: Negative for photophobia and visual disturbance.   Respiratory: Negative for shortness of breath.    Cardiovascular: Negative for chest pain.   Gastrointestinal: Negative for abdominal pain, " constipation, diarrhea, nausea and vomiting.   Genitourinary: Negative for dysuria and hematuria.   Musculoskeletal: Negative for neck pain and neck stiffness.   Neurological: Negative for dizziness, weakness, light-headedness, numbness and headaches.   Psychiatric/Behavioral: Positive for hallucinations. Negative for confusion, sleep disturbance and suicidal ideas.        +depressed       Physical Exam     Initial Vitals [06/02/18 1028]   BP Pulse Resp Temp SpO2   (!) 161/85 84 16 98.1 °F (36.7 °C) 95 %      MAP       110.33         Physical Exam    Constitutional: He appears well-developed and well-nourished. He is not diaphoretic. No distress.   HENT:   Head: Normocephalic and atraumatic.   Neck: Normal range of motion. Neck supple.   Musculoskeletal: Normal range of motion.   Neurological: He is alert and oriented to person, place, and time.   Skin: Skin is warm and dry.   Psychiatric: His speech is normal and behavior is normal. Judgment normal. His mood appears not anxious. His affect is not angry and not inappropriate. He exhibits a depressed mood. He expresses no homicidal and no suicidal ideation. He expresses no suicidal plans and no homicidal plans. He is attentive.         ED Course   Procedures  Labs Reviewed   CBC W/ AUTO DIFFERENTIAL - Abnormal; Notable for the following:        Result Value    WBC 13.92 (*)     Gran # (ANC) 10.9 (*)     Immature Grans (Abs) 0.07 (*)     Gran% 78.4 (*)     Lymph% 13.6 (*)     All other components within normal limits   COMPREHENSIVE METABOLIC PANEL - Abnormal; Notable for the following:     Chloride 111 (*)     BUN, Bld 22 (*)     ALT 8 (*)     Anion Gap 7 (*)     All other components within normal limits   ACETAMINOPHEN LEVEL - Abnormal; Notable for the following:     Acetaminophen (Tylenol), Serum <3.0 (*)     All other components within normal limits   TSH   DRUG SCREEN PANEL, URINE EMERGENCY    Narrative:     Preferred Collection Type->Urine, Clean Catch  cup    ALCOHOL,MEDICAL (ETHANOL)   URINALYSIS, REFLEX TO URINE CULTURE    Narrative:     Preferred Collection Type->Urine, Clean Catch  cup             Medical Decision Making:   History:   Old Medical Records: I decided to obtain old medical records.  Clinical Tests:   Lab Tests: Ordered and Reviewed  Other:   I have discussed this case with another health care provider.       <> Summary of the Discussion: Psychiatry        APC / Resident Notes:   48-year-old male with past medical history of bipolar presents to the ED for psychiatric evaluation.  Vital signs stable. The patient reports depression and that he feels that he has hit rock bottom.  Denies any suicidal or homicidal ideation.  No plan for suicide.  Patient lives alone.  Will obtain psych labs and discuss with Psychiatry.    Patient is medically cleared.    Psychiatry evaluated in the ED. Recommend PEC for SI and depression.    PEC signed by Dr Manjarrez. Transferred to psych facility.    He was given IM toradol and gabapentin for chronic back pain.       Scribe Attestation:   Scribe #1: I performed the above scribed service and the documentation accurately describes the services I performed. I attest to the accuracy of the note.    Attending Attestation:     Physician Attestation Statement for NP/PA:   I have conducted a face to face encounter with this patient in addition to the NP/PA, due to Medical Complexity    Other NP/PA Attestation Additions:    History of Present Illness: 48 y.o. male with history of depression recently admitted for the same. He states that since discharge he has not been taking his medications and has been very depressed. He is concerned he may need to be back in the hospital.    Physical Exam: Normal affect. Good eye contact. Depressed mood.    Medical Decision Making: Pt was evaluated by psychiatry who feels that he should be PEC'd given the severity of his depression and that he is high risk. He was therefore PEC'd and will be  admitted to a psychiatric facility. He is medically cleared for placement.                     Clinical Impression:   The encounter diagnosis was Depression, unspecified depression type.    No orders to display       Disposition:   Disposition: Transferred  Psych                        Suzanne Junior PA-C  06/02/18 9545

## 2018-06-02 NOTE — ED NOTES
Patient on stretcher, Bed placed in low/locked position, side rails up x 2. Patient placed into position of comfort. Patient in NAD and offers no complaints at this time. Sitter at bedside. Will continue to monitor.

## 2020-06-13 ENCOUNTER — HOSPITAL ENCOUNTER (EMERGENCY)
Facility: HOSPITAL | Age: 50
Discharge: PSYCHIATRIC OR SUBSTANCE USE FACILITY | End: 2020-06-15
Attending: EMERGENCY MEDICINE
Payer: MEDICAID

## 2020-06-13 DIAGNOSIS — R45.1 AGITATION: ICD-10-CM

## 2020-06-13 DIAGNOSIS — F23 ACUTE PSYCHOSIS: Primary | ICD-10-CM

## 2020-06-13 LAB
ALBUMIN SERPL BCP-MCNC: 3.7 G/DL (ref 3.5–5.2)
ALP SERPL-CCNC: 103 U/L (ref 55–135)
ALT SERPL W/O P-5'-P-CCNC: 6 U/L (ref 10–44)
ANION GAP SERPL CALC-SCNC: 10 MMOL/L (ref 8–16)
APAP SERPL-MCNC: <3 UG/ML (ref 10–20)
AST SERPL-CCNC: 16 U/L (ref 10–40)
BASOPHILS # BLD AUTO: 0.09 K/UL (ref 0–0.2)
BASOPHILS NFR BLD: 1.3 % (ref 0–1.9)
BILIRUB SERPL-MCNC: 0.2 MG/DL (ref 0.1–1)
BUN SERPL-MCNC: 11 MG/DL (ref 6–20)
CALCIUM SERPL-MCNC: 9.1 MG/DL (ref 8.7–10.5)
CHLORIDE SERPL-SCNC: 113 MMOL/L (ref 95–110)
CO2 SERPL-SCNC: 19 MMOL/L (ref 23–29)
CREAT SERPL-MCNC: 1.1 MG/DL (ref 0.5–1.4)
DIFFERENTIAL METHOD: ABNORMAL
EOSINOPHIL # BLD AUTO: 0.3 K/UL (ref 0–0.5)
EOSINOPHIL NFR BLD: 4.9 % (ref 0–8)
ERYTHROCYTE [DISTWIDTH] IN BLOOD BY AUTOMATED COUNT: 13.3 % (ref 11.5–14.5)
EST. GFR  (AFRICAN AMERICAN): >60 ML/MIN/1.73 M^2
EST. GFR  (NON AFRICAN AMERICAN): >60 ML/MIN/1.73 M^2
ETHANOL SERPL-MCNC: 88 MG/DL
GLUCOSE SERPL-MCNC: 80 MG/DL (ref 70–110)
HCT VFR BLD AUTO: 41.4 % (ref 40–54)
HGB BLD-MCNC: 13.3 G/DL (ref 14–18)
IMM GRANULOCYTES # BLD AUTO: 0.02 K/UL (ref 0–0.04)
IMM GRANULOCYTES NFR BLD AUTO: 0.3 % (ref 0–0.5)
LYMPHOCYTES # BLD AUTO: 2.4 K/UL (ref 1–4.8)
LYMPHOCYTES NFR BLD: 35.1 % (ref 18–48)
MCH RBC QN AUTO: 30.6 PG (ref 27–31)
MCHC RBC AUTO-ENTMCNC: 32.1 G/DL (ref 32–36)
MCV RBC AUTO: 95 FL (ref 82–98)
MONOCYTES # BLD AUTO: 0.5 K/UL (ref 0.3–1)
MONOCYTES NFR BLD: 7.5 % (ref 4–15)
NEUTROPHILS # BLD AUTO: 3.4 K/UL (ref 1.8–7.7)
NEUTROPHILS NFR BLD: 50.9 % (ref 38–73)
NRBC BLD-RTO: 0 /100 WBC
PLATELET # BLD AUTO: 275 K/UL (ref 150–350)
PMV BLD AUTO: 9.7 FL (ref 9.2–12.9)
POTASSIUM SERPL-SCNC: 3.6 MMOL/L (ref 3.5–5.1)
PROT SERPL-MCNC: 6.6 G/DL (ref 6–8.4)
RBC # BLD AUTO: 4.35 M/UL (ref 4.6–6.2)
SARS-COV-2 RDRP RESP QL NAA+PROBE: NEGATIVE
SODIUM SERPL-SCNC: 142 MMOL/L (ref 136–145)
TSH SERPL DL<=0.005 MIU/L-ACNC: 0.41 UIU/ML (ref 0.4–4)
WBC # BLD AUTO: 6.7 K/UL (ref 3.9–12.7)

## 2020-06-13 PROCEDURE — 80307 DRUG TEST PRSMV CHEM ANLYZR: CPT

## 2020-06-13 PROCEDURE — 84443 ASSAY THYROID STIM HORMONE: CPT

## 2020-06-13 PROCEDURE — 81003 URINALYSIS AUTO W/O SCOPE: CPT

## 2020-06-13 PROCEDURE — 85025 COMPLETE CBC W/AUTO DIFF WBC: CPT

## 2020-06-13 PROCEDURE — 80320 DRUG SCREEN QUANTALCOHOLS: CPT

## 2020-06-13 PROCEDURE — 80329 ANALGESICS NON-OPIOID 1 OR 2: CPT

## 2020-06-13 PROCEDURE — 99284 EMERGENCY DEPT VISIT MOD MDM: CPT | Mod: ,,, | Performed by: EMERGENCY MEDICINE

## 2020-06-13 PROCEDURE — 99284 PR EMERGENCY DEPT VISIT,LEVEL IV: ICD-10-PCS | Mod: ,,, | Performed by: EMERGENCY MEDICINE

## 2020-06-13 PROCEDURE — U0002 COVID-19 LAB TEST NON-CDC: HCPCS

## 2020-06-13 PROCEDURE — 80053 COMPREHEN METABOLIC PANEL: CPT

## 2020-06-13 PROCEDURE — 63600175 PHARM REV CODE 636 W HCPCS: Performed by: EMERGENCY MEDICINE

## 2020-06-13 RX ORDER — HALOPERIDOL 5 MG/ML
5 INJECTION INTRAMUSCULAR
Status: COMPLETED | OUTPATIENT
Start: 2020-06-13 | End: 2020-06-13

## 2020-06-13 RX ORDER — LORAZEPAM 2 MG/ML
4 INJECTION INTRAMUSCULAR
Status: COMPLETED | OUTPATIENT
Start: 2020-06-13 | End: 2020-06-13

## 2020-06-13 RX ORDER — LORAZEPAM 2 MG/ML
2 INJECTION INTRAMUSCULAR
Status: COMPLETED | OUTPATIENT
Start: 2020-06-13 | End: 2020-06-13

## 2020-06-13 RX ORDER — DIPHENHYDRAMINE HYDROCHLORIDE 50 MG/ML
50 INJECTION INTRAMUSCULAR; INTRAVENOUS
Status: COMPLETED | OUTPATIENT
Start: 2020-06-13 | End: 2020-06-13

## 2020-06-13 RX ADMIN — LORAZEPAM 4 MG: 2 INJECTION INTRAMUSCULAR; INTRAVENOUS at 07:06

## 2020-06-13 RX ADMIN — HALOPERIDOL LACTATE 5 MG: 5 INJECTION, SOLUTION INTRAMUSCULAR at 07:06

## 2020-06-13 RX ADMIN — DIPHENHYDRAMINE HYDROCHLORIDE 50 MG: 50 INJECTION, SOLUTION INTRAMUSCULAR; INTRAVENOUS at 07:06

## 2020-06-13 RX ADMIN — LORAZEPAM 2 MG: 2 INJECTION INTRAMUSCULAR; INTRAVENOUS at 07:06

## 2020-06-14 PROBLEM — F31.77 BIPOLAR 1 DISORDER, MIXED, PARTIAL REMISSION: Status: ACTIVE | Noted: 2020-06-14

## 2020-06-14 LAB
AMPHET+METHAMPHET UR QL: NEGATIVE
BARBITURATES UR QL SCN>200 NG/ML: NEGATIVE
BENZODIAZ UR QL SCN>200 NG/ML: NEGATIVE
BILIRUB UR QL STRIP: NEGATIVE
BZE UR QL SCN: NEGATIVE
CANNABINOIDS UR QL SCN: NEGATIVE
CLARITY UR REFRACT.AUTO: CLEAR
COLOR UR AUTO: YELLOW
CREAT UR-MCNC: 93 MG/DL (ref 23–375)
GLUCOSE UR QL STRIP: NEGATIVE
HGB UR QL STRIP: NEGATIVE
KETONES UR QL STRIP: NEGATIVE
LEUKOCYTE ESTERASE UR QL STRIP: NEGATIVE
METHADONE UR QL SCN>300 NG/ML: NEGATIVE
NITRITE UR QL STRIP: NEGATIVE
OPIATES UR QL SCN: NEGATIVE
PCP UR QL SCN>25 NG/ML: NEGATIVE
PH UR STRIP: 5 [PH] (ref 5–8)
PROT UR QL STRIP: NEGATIVE
SP GR UR STRIP: 1.01 (ref 1–1.03)
TOXICOLOGY INFORMATION: NORMAL
URN SPEC COLLECT METH UR: NORMAL

## 2020-06-14 PROCEDURE — 25000003 PHARM REV CODE 250: Performed by: EMERGENCY MEDICINE

## 2020-06-14 RX ORDER — DIVALPROEX SODIUM 250 MG/1
750 TABLET, DELAYED RELEASE ORAL 2 TIMES DAILY
Status: DISCONTINUED | OUTPATIENT
Start: 2020-06-14 | End: 2020-06-15 | Stop reason: HOSPADM

## 2020-06-14 RX ORDER — HYDROCODONE BITARTRATE AND ACETAMINOPHEN 10; 325 MG/1; MG/1
1 TABLET ORAL
Status: COMPLETED | OUTPATIENT
Start: 2020-06-14 | End: 2020-06-14

## 2020-06-14 RX ORDER — ACETAMINOPHEN 500 MG
1000 TABLET ORAL
Status: COMPLETED | OUTPATIENT
Start: 2020-06-14 | End: 2020-06-14

## 2020-06-14 RX ORDER — IBUPROFEN 400 MG/1
800 TABLET ORAL
Status: DISCONTINUED | OUTPATIENT
Start: 2020-06-14 | End: 2020-06-14

## 2020-06-14 RX ADMIN — DIVALPROEX SODIUM 750 MG: 250 TABLET, DELAYED RELEASE ORAL at 09:06

## 2020-06-14 RX ADMIN — HYDROCODONE BITARTRATE AND ACETAMINOPHEN 1 TABLET: 10; 325 TABLET ORAL at 04:06

## 2020-06-14 RX ADMIN — ACETAMINOPHEN 1000 MG: 500 TABLET ORAL at 09:06

## 2020-06-14 NOTE — ED PROVIDER NOTES
"Encounter Date: 6/13/2020       History     Chief Complaint   Patient presents with    Psychiatric Evaluation     pt states, "I hate every motherfucker walking!", reports SI/HI     51 yo M with pmhx bipolar disorder arrives to the emergency department with a chief complaint of agitation.  Patient arrived via EMS.  Severity of patient's agitation limited much of this history.  Patient is agitated and yelling I hate every motherfucker walking!"  And "Gravity do NOT exist!".  On chart review, patient has had previous inpatient psychiatric hospitalizations.        Review of patient's allergies indicates:  No Known Allergies  Past Medical History:   Diagnosis Date    Bipolar depression 2012    Low back pain      Past Surgical History:   Procedure Laterality Date    LUMBAR DISC SURGERY  2011     No family history on file.  Social History     Tobacco Use    Smoking status: Current Every Day Smoker     Packs/day: 0.50     Types: Cigarettes   Substance Use Topics    Alcohol use: Yes    Drug use: Yes     Types: Marijuana     Review of Systems   Unable to perform ROS: Psychiatric disorder   Psychiatric/Behavioral: Positive for agitation. The patient is nervous/anxious.        Physical Exam     Initial Vitals   BP Pulse Resp Temp SpO2   06/13/20 1844 06/13/20 1844 06/13/20 1844 06/13/20 2334 06/13/20 1844   138/86 92 16 98.2 °F (36.8 °C) 99 %      MAP       --                Physical Exam    Nursing note and vitals reviewed.  Constitutional: He is diaphoretic. He appears distressed.   Agitated, yelling at staff members   HENT:   Head: Atraumatic.   Eyes: EOM are normal.   Neck: No tracheal deviation present.   Cardiovascular: Normal rate.   Pulmonary/Chest: No respiratory distress.   Musculoskeletal: Normal range of motion.      Comments: Bilateral hands/wrists in splint   Neurological: He has normal strength.   Alert, yelling, does not follow commands   Skin: Skin is warm.   Psychiatric: His mood appears anxious. His " affect is inappropriate. His speech is rapid and/or pressured. He is agitated. He expresses impulsivity and inappropriate judgment.   Agitated, combative, yelling at staff and threatening         ED Course   Procedures  Labs Reviewed   CBC W/ AUTO DIFFERENTIAL - Abnormal; Notable for the following components:       Result Value    RBC 4.35 (*)     Hemoglobin 13.3 (*)     All other components within normal limits   COMPREHENSIVE METABOLIC PANEL - Abnormal; Notable for the following components:    Chloride 113 (*)     CO2 19 (*)     ALT 6 (*)     All other components within normal limits   ALCOHOL,MEDICAL (ETHANOL) - Abnormal; Notable for the following components:    Alcohol, Medical, Serum 88 (*)     All other components within normal limits   ACETAMINOPHEN LEVEL - Abnormal; Notable for the following components:    Acetaminophen (Tylenol), Serum <3.0 (*)     All other components within normal limits   TSH   URINALYSIS, REFLEX TO URINE CULTURE    Narrative:     Preferred Collection Type->Urine, Clean Catch  Specimen Source->Urine   DRUG SCREEN PANEL, URINE EMERGENCY    Narrative:     Preferred Collection Type->Urine, Clean Catch  Specimen Source->Urine   SARS-COV-2 RNA AMPLIFICATION, QUAL          Imaging Results    None          Medical Decision Making:   History:   I obtained history from: EMS provider.  Old Medical Records: I decided to obtain old medical records.  Initial Assessment:   51 yo M with pmhx bipolar disorder arrives to the emergency department with a chief complaint of agitation.   Differential Diagnosis:   Substance induced mood disorder psychosis, drug intoxication versus withdrawal, metabolic issues  Clinical Tests:   Lab Tests: Ordered  ED Management:  Patient agitated, combative, threatening to staff.  Attempts at verbal deescalation were unsuccessful.  Patient required chemical and physical restraints.  Will investigate with psychiatric orders and complete PEC.    Reassessment:  Patient resting  "comfortably at this time.  Patient is unable to provide much history but does endorse drinking "a beer."  Denies illicits.    Reassessment:  Vital signs stable.  COVID negative.  CBC without leukocytosis.  Hemoglobin 13.3.  Alcohol mildly elevated at 88.  Remainder of labs pending.  At this time, my shift is coming to a close, and the patient was signed out to incoming MD.                                 Clinical Impression:       ICD-10-CM ICD-9-CM   1. Acute psychosis  F23 298.9   2. Agitation  R45.1 307.9             ED Disposition Condition    Transfer to Psych Facility         ED Prescriptions     None        Follow-up Information    None                                    Vikram Kenyon MD  06/14/20 143    "

## 2020-06-14 NOTE — ED TRIAGE NOTES
Pt brought by EMS with combative behavior. Pt reports bilateral hand pain from previous injuries and hasn't received pain medicine since Monday. Pt irate, violent, and threatening staff on arrival. Pt reports HI and SI.

## 2020-06-14 NOTE — ED NOTES
At 0400- Pt remains in paper scrubs. Pt resting with eyes closed. No signs of distress noted. Sitter remains at bedside in direct visual contact, charting per protocol every 15 minutes. No equipment or belongings are in the patients room.  Will continue to monitor.

## 2020-06-14 NOTE — ED NOTES
Pt aaox4, breathing equal and non-labored in no acute distress. Pt calm and cooperative and willing to work with staff.

## 2020-06-14 NOTE — ED NOTES
Spoke with centralized placement, still waiting to find a bed available somewhere. Will continue to monitor.

## 2020-06-14 NOTE — ED NOTES
At 0300- pt sleeping in stretcher, NAD noted. Respirations even and unlabored. Sitter remains in direct line of vision of pt. Awaiting placement at this time.

## 2020-06-14 NOTE — PROVIDER PROGRESS NOTES - EMERGENCY DEPT.
Encounter Date: 6/13/2020    ED Physician Progress Notes           ED Attending Sign-out Progress Note:  Patient signed out to me at shift change by Dr. Kenyon to f/u UA and UDS and medically clear. Once these returned, it is expected the patient would be medically clear for inpatient psychiatric treatment.    UA and UDS normal.  Patient is medically clear for inpatient psychiatric treatment.  Will seek placement for inpatient psych treatment.    Had difficulty obtaining placement throughout the night.  No issues with agitation during shift with patient. He was calm with nursing staff.     Signed out to Dr. Byrd at shift change to monitor in ED until psych placement achieved.    ED Clinical Impression:    ICD-10-CM ICD-9-CM   1. Agitation  R45.1 307.9   2. Acute psychosis  F23 298.9

## 2020-06-14 NOTE — ED NOTES
Pt irate and combative, violent. Pt threatining ED staff. Rn x4,security x5 and MD at bedside. Verbal orders given.

## 2020-06-14 NOTE — ED NOTES
Pt continually asking for food. Confirmed with dietary that meal tray is to be delivered. Pt given juice and snack in the meantime. Will continue to monitor.

## 2020-06-14 NOTE — ED NOTES
At 0000- Pt remains in paper scrubs, sleeping in stretcher comfortably. No signs of distress noted. Sitter remains at bedside in direct visual contact, charting per protocol every 15 minutes. No equipment or belongings are in the patients room.  Will continue to monitor.

## 2020-06-14 NOTE — ED NOTES
At 2300- Report received from Dagoberto Pavon RN. Assume care of pt. Pt sleeping comfortably and independently repositioned in stretcher with bed locked in lowest position for safety. NAD noted at this time. Respirations even and unlabored and visible chest rise noted. sotero Duff at bedside charting per protocol.

## 2020-06-15 VITALS
HEART RATE: 66 BPM | RESPIRATION RATE: 16 BRPM | BODY MASS INDEX: 21.67 KG/M2 | HEIGHT: 72 IN | WEIGHT: 160 LBS | OXYGEN SATURATION: 97 % | SYSTOLIC BLOOD PRESSURE: 130 MMHG | DIASTOLIC BLOOD PRESSURE: 72 MMHG | TEMPERATURE: 98 F

## 2020-06-15 PROBLEM — F30.9 MANIA: Status: ACTIVE | Noted: 2020-06-15

## 2020-06-15 PROCEDURE — 99285 EMERGENCY DEPT VISIT HI MDM: CPT | Mod: 25

## 2020-06-15 PROCEDURE — 25000003 PHARM REV CODE 250: Performed by: EMERGENCY MEDICINE

## 2020-06-15 PROCEDURE — 96372 THER/PROPH/DIAG INJ SC/IM: CPT

## 2020-06-15 RX ORDER — HYDROCODONE BITARTRATE AND ACETAMINOPHEN 10; 325 MG/1; MG/1
1 TABLET ORAL EVERY 8 HOURS PRN
Status: DISCONTINUED | OUTPATIENT
Start: 2020-06-15 | End: 2020-06-15 | Stop reason: HOSPADM

## 2020-06-15 RX ADMIN — HYDROCODONE BITARTRATE AND ACETAMINOPHEN 1 TABLET: 10; 325 TABLET ORAL at 01:06

## 2020-06-15 RX ADMIN — DIVALPROEX SODIUM 750 MG: 250 TABLET, DELAYED RELEASE ORAL at 09:06

## 2020-06-15 RX ADMIN — HYDROCODONE BITARTRATE AND ACETAMINOPHEN 1 TABLET: 10; 325 TABLET ORAL at 02:06

## 2020-06-15 NOTE — ED NOTES
Pt transferred to  2, he is calm and cooperative. Pt checked for contraband and wearing paper scrubs. Pt has a bright affect. Pt appearance is well kept. Pt denies SI/HI/AVH's at this time. Pt has delusional thoughts and does report VH's at times. Pt has a cast in his Lt FA and a splint on his Rt FA. Pt able to move his fingers, has good capillary refill in fingers on both hands. Pt instructed on the PEC placement process, he verbalizes understanding. [facility] notified of PEC transfer. Pt lying in bed resting, DVC maintained.

## 2020-06-15 NOTE — PROVIDER PROGRESS NOTES - EMERGENCY DEPT.
Encounter Date: 6/13/2020    ED Physician Progress Notes        Physician Note:   Received pt at signout    51 yo M PEC'd for acute psychosis.  He has a cast on his arm which is considered a weapon and makes it difficult to place him in psychiatric facility.  Psychiatry was consulted for recommendations.  They recommend Depakote 750 b.i.d. which I ordered.  Case discussed with hospital medicine who does not see any indication for inpatient hospitalization here. Vital signs stable.

## 2020-06-15 NOTE — ASSESSMENT & PLAN NOTE
Jayde Rao is a 50 y.o. male with a past psychiatric history of bipolar do, currently presenting with <principal problem not specified>. Emergency Psychiatry was originally consulted to address the patient's symptoms of psychosis. Of note, pt has cast which could make him difficult to place on a psychiatry unit.    IMPRESSION  Bipolar Type 1, MRE mixed    RECOMMENDATION(S)      1. Scheduled Medication(s):  - recommend Depakote 750 po BID  - Depakote level before administration on day 4    2. PRN Medication(s):  - recommend Haldol 5mg po/IM q6 hrs or Ativan 2mg po/IM q4 hrs PRN for non redirectable agitation or breakthrough psychosis    3. Legal Status/Precaution(s):  Continue PEC-CEC, as patient remains at imminent risk of danger to self or others, and is gravely disabled secondary to a major mental illness. Once medically cleared by ED/Primary MD, recommend seeking placement in an acute psychiatric facility for safety and medical stabilization of current psychiatric symptomatology.    In cases of emergency, daily coverage provided by Acute/ED Psych MD, NP, or SW, with associated contact numbers listed in the [facility] On Call Schedule.    Case discussed with emergency psychiatry staff: Dr. Serafin Valentino

## 2020-06-15 NOTE — ED NOTES
Patient is resting comfortably. Pt denies SI/HI/AVH's. Pt thoughts are focused on discharge. Pt instructed on the PEC process he verbalizes understanding.
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
## 2020-06-15 NOTE — ED NOTES
Pt escorted to the [facility] transportation vehicle. [facility] staff given PEC and 1 bag of belongings. Pt remained calm and cooperative for the transfer.

## 2020-06-15 NOTE — ED NOTES
Pt notified of his pending transfer. Pt does not want to contact family or friends of his pending transfer.

## 2020-06-15 NOTE — SUBJECTIVE & OBJECTIVE
Patient History           Medical as of 6/14/2020     Past Medical History     Diagnosis Date Comments Source    Bipolar depression 2012 -- Provider    Low back pain -- -- Provider                  Surgical as of 6/14/2020     Past Surgical History     Procedure Laterality Date Comments Source    LUMBAR DISC SURGERY -- 2011 -- Provider                  Family as of 6/14/2020    None           Tobacco Use as of 6/14/2020     Smoking Status Smoking Start Date Smoking Quit Date Packs/Day Years Used    Current Every Day Smoker -- -- 0.50 --    Types Comments Smokeless Tobacco Status Smokeless Tobacco Quit Date Source     Cigarettes -- Unknown -- Provider            Alcohol Use as of 6/14/2020     Alcohol Use Drinks/Week Alcohol/Week Comments Source    Yes   -- -- Provider    Frequency Typical Drinks Binge Drinking        -- -- --              Drug Use as of 6/14/2020     Drug Use Types Frequency Comments Source    Yes  Marijuana -- -- Provider            Sexual Activity as of 6/14/2020     Sexually Active Birth Control Partners Comments Source    Yes -- -- -- Provider            Activities of Daily Living as of 6/14/2020    None           Social Documentation as of 6/14/2020    None           Occupational as of 6/14/2020    None           Socioeconomic as of 6/14/2020     Marital Status Spouse Name Number of Children Years Education Education Level Preferred Language Ethnicity Race Source     -- -- -- -- English /Black Black or  --    Financial Resource Strain Food Insecurity: Worry Food Insecurity: Inability Transportation Needs: Medical Transportation Needs: Non-medical    -- -- -- -- --            Pertinent History     Question Response Comments    Lives with -- --    Place in Birth Order -- --    Lives in -- --    Number of Siblings -- --    Raised by -- --    Legal Involvement -- --    Childhood Trauma -- --    Criminal History of -- --    Financial Status -- --     Highest Level of Education -- --    Does patient have access to a firearm? -- --     Service -- --    Primary Leisure Activity -- --    Spirituality -- --        Past Medical History:   Diagnosis Date    Bipolar depression 2012    Low back pain      Past Surgical History:   Procedure Laterality Date    LUMBAR DISC SURGERY  2011     Family History     None        Tobacco Use    Smoking status: Current Every Day Smoker     Packs/day: 0.50     Types: Cigarettes   Substance and Sexual Activity    Alcohol use: Yes    Drug use: Yes     Types: Marijuana    Sexual activity: Yes     Review of patient's allergies indicates:  No Known Allergies    No current facility-administered medications on file prior to encounter.      Current Outpatient Medications on File Prior to Encounter   Medication Sig    nicotine, polacrilex, (NICORETTE) 2 mg Gum Take 1 each (2 mg total) by mouth every hour as needed (smoking cessation).    QUEtiapine (SEROQUEL) 100 MG Tab Take 1 tablet (100 mg total) by mouth every evening.    venlafaxine (EFFEXOR-XR) 37.5 MG 24 hr capsule Take 1 capsule (37.5 mg total) by mouth once daily.     Psychotherapeutics (From admission, onward)    None        Review of Systems   Constitutional: Negative for fever.   HENT: Negative for sore throat.    Eyes: Negative for pain.   Respiratory: Negative for shortness of breath.    Cardiovascular: Negative for chest pain.   Gastrointestinal: Negative for abdominal pain.   Endocrine: Negative for polyuria.   Genitourinary: Negative for dysuria.   Musculoskeletal: Negative for myalgias.   Skin: Negative for color change.   Neurological: Negative for dizziness.   Hematological: Does not bruise/bleed easily.   Psychiatric/Behavioral: Positive for hallucinations.     Strengths and Liabilities: Strength: Patient is intelligent., Liability: Patient has no suport network., Liability: Patient has poor judgment, Liability: Patient lacks coping skills.    Objective:  "    Vital Signs (Most Recent):  Temp: 98.6 °F (37 °C) (06/14/20 0517)  Pulse: 74 (06/14/20 0918)  Resp: 16 (06/14/20 0918)  BP: 102/66 (06/14/20 0918)  SpO2: 99 % (06/14/20 0918) Vital Signs (24h Range):  Temp:  [98.2 °F (36.8 °C)-98.6 °F (37 °C)] 98.6 °F (37 °C)  Pulse:  [74-82] 74  Resp:  [16-18] 16  SpO2:  [98 %-100 %] 99 %  BP: (102-133)/(66-93) 102/66     Height: 6' (182.9 cm)  Weight: 72.6 kg (160 lb)  Body mass index is 21.7 kg/m².    No intake or output data in the 24 hours ending 06/14/20 1911    Physical Exam  Constitutional:       General: He is not in acute distress.  HENT:      Head: Normocephalic and atraumatic.      Nose: Nose normal.   Eyes:      Conjunctiva/sclera: Conjunctivae normal.   Neck:      Musculoskeletal: Normal range of motion.   Pulmonary:      Effort: Pulmonary effort is normal.   Abdominal:      General: There is no distension.   Musculoskeletal: Normal range of motion.      Comments: Cast on left arm/hand, dressing on right     Neurological:      Comments: Oriented to self, year   Psychiatric:      Comments: Mental Status Exam:  Appearance: unremarkable, age appropriate, lying in bed  Level of Consciousness: alert and awake  Behavior/Cooperation: normal, cooperative, restless and fidgety , eye contact normal  Psychomotor: unremarkable   Speech: normal tone, normal rate, normal pitch, normal volume  Language: english, fluid  Orientation: person, day of week, month of year, year, disoriented to hospital ([facility]) although oriented to city [place]  Attention Span/Concentration: unable to spell "WORLD" backwards  Memory: Grossly intact  Mood: "dysphoric"  Affect: labile  Thought Process: normal and logical  Associations: normal and logical  Thought Content: endorses SI but denies intent or plan, denies HI   Perception: endorses AH of voice predicting future and VH of black pyramids  Fund of Knowledge: Vitaly Cool  Abstraction: similarities were concrete  Insight: limited  Judgment: limited "          Significant Labs: All pertinent labs within the past 24 hours have been reviewed.    Significant Imaging: I have reviewed all pertinent imaging results/findings within the past 24 hours.
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
## 2020-06-18 PROBLEM — F12.10 TETRAHYDROCANNABINOL (THC) USE DISORDER, MILD, ABUSE: Status: RESOLVED | Noted: 2018-06-02 | Resolved: 2020-06-18

## 2020-06-18 PROBLEM — F43.9 TRAUMA AND STRESSOR-RELATED DISORDER: Status: ACTIVE | Noted: 2020-06-18

## 2020-06-18 PROBLEM — S52.502A CLOSED FRACTURE OF DISTAL END OF LEFT RADIUS: Status: ACTIVE | Noted: 2020-06-18

## 2020-06-18 PROBLEM — F31.60 BIPOLAR 1 DISORDER, MIXED: Status: ACTIVE | Noted: 2020-06-14

## 2020-06-18 PROBLEM — D72.829 LEUKOCYTOSIS: Status: RESOLVED | Noted: 2018-06-02 | Resolved: 2020-06-18

## 2020-06-18 PROBLEM — R45.851 SUICIDAL IDEATION: Status: RESOLVED | Noted: 2018-06-02 | Resolved: 2020-06-18

## 2020-06-18 PROBLEM — F32.9 MAJOR DEPRESSION, SINGLE EPISODE: Status: RESOLVED | Noted: 2018-06-02 | Resolved: 2020-06-18

## 2022-12-29 NOTE — ED NOTES
At 0100- The patient is resting quietly, eyes closed, arouses easily to stimuli. Airway is open and patent, respirations are spontaneous, normal respiratory effort and rate noted, skin warm and dry, appearance: in no acute distress and resting comfortably. Sitter remains at bedside, charting per protocol.    Humira Counseling:  I discussed with the patient the risks of adalimumab including but not limited to myelosuppression, immunosuppression, autoimmune hepatitis, demyelinating diseases, lymphoma, and serious infections.  The patient understands that monitoring is required including a PPD at baseline and must alert us or the primary physician if symptoms of infection or other concerning signs are noted.